# Patient Record
Sex: MALE | Race: BLACK OR AFRICAN AMERICAN | Employment: UNEMPLOYED | URBAN - NONMETROPOLITAN AREA
[De-identification: names, ages, dates, MRNs, and addresses within clinical notes are randomized per-mention and may not be internally consistent; named-entity substitution may affect disease eponyms.]

---

## 2020-12-06 ENCOUNTER — APPOINTMENT (OUTPATIENT)
Dept: CT IMAGING | Age: 25
End: 2020-12-06

## 2020-12-06 ENCOUNTER — HOSPITAL ENCOUNTER (EMERGENCY)
Age: 25
Discharge: HOME | End: 2020-12-06

## 2020-12-06 VITALS
HEART RATE: 85 BPM | WEIGHT: 140 LBS | SYSTOLIC BLOOD PRESSURE: 147 MMHG | RESPIRATION RATE: 18 BRPM | DIASTOLIC BLOOD PRESSURE: 85 MMHG | TEMPERATURE: 97.8 F | OXYGEN SATURATION: 97 %

## 2020-12-06 PROCEDURE — 2580000003 HC RX 258: Performed by: PHYSICIAN ASSISTANT

## 2020-12-06 PROCEDURE — 70486 CT MAXILLOFACIAL W/O DYE: CPT

## 2020-12-06 PROCEDURE — 6360000004 HC RX CONTRAST MEDICATION: Performed by: PHYSICIAN ASSISTANT

## 2020-12-06 PROCEDURE — 6370000000 HC RX 637 (ALT 250 FOR IP)

## 2020-12-06 PROCEDURE — 72125 CT NECK SPINE W/O DYE: CPT

## 2020-12-06 PROCEDURE — 6360000002 HC RX W HCPCS: Performed by: PHYSICIAN ASSISTANT

## 2020-12-06 PROCEDURE — 76376 3D RENDER W/INTRP POSTPROCES: CPT

## 2020-12-06 PROCEDURE — 74177 CT ABD & PELVIS W/CONTRAST: CPT

## 2020-12-06 PROCEDURE — 71260 CT THORAX DX C+: CPT

## 2020-12-06 PROCEDURE — 70450 CT HEAD/BRAIN W/O DYE: CPT

## 2020-12-06 PROCEDURE — 99284 EMERGENCY DEPT VISIT MOD MDM: CPT

## 2020-12-06 PROCEDURE — 90715 TDAP VACCINE 7 YRS/> IM: CPT | Performed by: PHYSICIAN ASSISTANT

## 2020-12-06 PROCEDURE — 6370000000 HC RX 637 (ALT 250 FOR IP): Performed by: PHYSICIAN ASSISTANT

## 2020-12-06 RX ORDER — BACITRACIN, NEOMYCIN, POLYMYXIN B 400; 3.5; 5 [USP'U]/G; MG/G; [USP'U]/G
OINTMENT TOPICAL
Status: COMPLETED
Start: 2020-12-06 | End: 2020-12-06

## 2020-12-06 RX ORDER — TETRACAINE HYDROCHLORIDE 5 MG/ML
SOLUTION OPHTHALMIC
Status: DISCONTINUED
Start: 2020-12-06 | End: 2020-12-07 | Stop reason: HOSPADM

## 2020-12-06 RX ORDER — HYDROCODONE BITARTRATE AND ACETAMINOPHEN 5; 325 MG/1; MG/1
1 TABLET ORAL EVERY 6 HOURS PRN
Qty: 10 TABLET | Refills: 0 | Status: SHIPPED | OUTPATIENT
Start: 2020-12-06 | End: 2020-12-09

## 2020-12-06 RX ORDER — 0.9 % SODIUM CHLORIDE 0.9 %
1000 INTRAVENOUS SOLUTION INTRAVENOUS ONCE
Status: COMPLETED | OUTPATIENT
Start: 2020-12-06 | End: 2020-12-06

## 2020-12-06 RX ORDER — SULFACETAMIDE SODIUM 100 MG/ML
1 SOLUTION/ DROPS OPHTHALMIC ONCE
Status: COMPLETED | OUTPATIENT
Start: 2020-12-06 | End: 2020-12-06

## 2020-12-06 RX ORDER — ALBUTEROL SULFATE 2.5 MG/3ML
2.5 SOLUTION RESPIRATORY (INHALATION) EVERY 6 HOURS PRN
Qty: 1 PACKAGE | Refills: 0 | Status: SHIPPED | OUTPATIENT
Start: 2020-12-06

## 2020-12-06 RX ADMIN — BACITRACIN ZINC, POLYMYXIN B SULFATE, NEOMYCIN SULFATE: 400; 5000; 3.5 OINTMENT TOPICAL at 22:09

## 2020-12-06 RX ADMIN — IOPAMIDOL 80 ML: 755 INJECTION, SOLUTION INTRAVENOUS at 20:13

## 2020-12-06 RX ADMIN — SODIUM CHLORIDE 1000 ML: 9 INJECTION, SOLUTION INTRAVENOUS at 19:19

## 2020-12-06 RX ADMIN — SULFACETAMIDE SODIUM 1 DROP: 100 SOLUTION/ DROPS OPHTHALMIC at 22:21

## 2020-12-06 RX ADMIN — TETANUS TOXOID, REDUCED DIPHTHERIA TOXOID AND ACELLULAR PERTUSSIS VACCINE, ADSORBED 0.5 ML: 5; 2.5; 8; 8; 2.5 SUSPENSION INTRAMUSCULAR at 19:18

## 2020-12-06 ASSESSMENT — ENCOUNTER SYMPTOMS
EYE PAIN: 1
SINUS PRESSURE: 0
SHORTNESS OF BREATH: 0
VOMITING: 0
BACK PAIN: 1
PHOTOPHOBIA: 0
ABDOMINAL PAIN: 0
EYE DISCHARGE: 1
NAUSEA: 1
RHINORRHEA: 0
COUGH: 0
SORE THROAT: 0
DIARRHEA: 0
EYE REDNESS: 1
FACIAL SWELLING: 1

## 2020-12-06 ASSESSMENT — VISUAL ACUITY: OU: 1

## 2020-12-06 ASSESSMENT — PAIN DESCRIPTION - PAIN TYPE: TYPE: ACUTE PAIN

## 2020-12-06 ASSESSMENT — PAIN DESCRIPTION - LOCATION: LOCATION: FACE;HEAD;BACK

## 2020-12-06 ASSESSMENT — PAIN DESCRIPTION - ORIENTATION: ORIENTATION: RIGHT;LEFT;LOWER;MID

## 2020-12-06 ASSESSMENT — PAIN SCALES - GENERAL: PAINLEVEL_OUTOF10: 6

## 2020-12-06 ASSESSMENT — PAIN DESCRIPTION - DESCRIPTORS: DESCRIPTORS: ACHING
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
## 2020-12-07 NOTE — ED NOTES
Pt. Resting in bed with even and unlabored respirations. Pt. States pain is a 5/10 at this time. Pt. Updated about plan of care and treatment. Friend  at bedside. Pt. Has no further concerns, questions or needs at this time. Call light within reach.         Regan Michele RN  12/06/20 2042

## 2020-12-07 NOTE — ED NOTES
Bedside report provided by Asad Hyde to [facility], RN. Pt resting in bed with even and unlabored respriations. Pt states pain is a  6/10 at this time. Pt updated on plan of care. Pt has no further needs at this time. Pt call light in reach.         Odilon Gupta RN  12/06/20 0006

## 2020-12-07 NOTE — ED NOTES
Pt. Resting in bed with even and unlabored respirations. Pt. States pain is a 6/10 at this time. Pt. Updated about plan of care and treatment. Family at bedside. Pt. Has no further concerns, questions or needs at this time. Call light within reach.         Aleksander Starr RN  12/06/20 2126